# Patient Record
Sex: FEMALE | Race: WHITE | NOT HISPANIC OR LATINO | URBAN - NONMETROPOLITAN AREA
[De-identification: names, ages, dates, MRNs, and addresses within clinical notes are randomized per-mention and may not be internally consistent; named-entity substitution may affect disease eponyms.]

---

## 2019-12-29 ENCOUNTER — APPOINTMENT (EMERGENCY)
Dept: RADIOLOGY | Facility: HOSPITAL | Age: 49
End: 2019-12-29
Payer: COMMERCIAL

## 2019-12-29 ENCOUNTER — HOSPITAL ENCOUNTER (EMERGENCY)
Facility: HOSPITAL | Age: 49
Discharge: HOME | End: 2019-12-29
Admitting: EMERGENCY MEDICINE
Payer: COMMERCIAL

## 2019-12-29 VITALS
RESPIRATION RATE: 18 BRPM | WEIGHT: 195 LBS | HEART RATE: 66 BPM | SYSTOLIC BLOOD PRESSURE: 190 MMHG | TEMPERATURE: 98.1 F | DIASTOLIC BLOOD PRESSURE: 88 MMHG | OXYGEN SATURATION: 98 %

## 2019-12-29 DIAGNOSIS — M77.9 TENDONITIS: Primary | ICD-10-CM

## 2019-12-29 PROCEDURE — 96372 THER/PROPH/DIAG INJ SC/IM: CPT

## 2019-12-29 PROCEDURE — 73080 X-RAY EXAM OF ELBOW: CPT

## 2019-12-29 PROCEDURE — 73130 X-RAY EXAM OF HAND: CPT

## 2019-12-29 PROCEDURE — 99284 EMERGENCY DEPT VISIT MOD MDM: CPT | Performed by: EMERGENCY MEDICINE

## 2019-12-29 PROCEDURE — 73110 X-RAY EXAM OF WRIST: CPT

## 2019-12-29 PROCEDURE — 99283 EMERGENCY DEPT VISIT LOW MDM: CPT

## 2019-12-29 RX ORDER — BISOPROLOL FUMARATE AND HYDROCHLOROTHIAZIDE 5; 6.25 MG/1; MG/1
1 TABLET ORAL DAILY
COMMUNITY
Start: 2018-07-05

## 2019-12-29 RX ORDER — KETOROLAC TROMETHAMINE 30 MG/ML
30 INJECTION, SOLUTION INTRAMUSCULAR; INTRAVENOUS ONCE
Status: COMPLETED | OUTPATIENT
Start: 2019-12-29 | End: 2019-12-29

## 2019-12-29 RX ORDER — FLUTICASONE PROPIONATE 50 MCG
2 SPRAY, SUSPENSION (ML) NASAL DAILY
COMMUNITY
Start: 2019-09-16

## 2019-12-29 RX ORDER — LEVOTHYROXINE SODIUM 112 UG/1
1 TABLET ORAL DAILY
COMMUNITY
Start: 2018-04-06

## 2019-12-29 RX ORDER — LISINOPRIL 10 MG/1
1 TABLET ORAL DAILY
COMMUNITY
Start: 2018-06-19

## 2019-12-29 RX ADMIN — KETOROLAC TROMETHAMINE 30 MG: 30 INJECTION, SOLUTION INTRAMUSCULAR at 13:56

## 2019-12-29 NOTE — ED PROVIDER NOTES
History  Chief Complaint   Patient presents with    Wrist Pain     right wrist pain with some swelling     The patient is a 44year old female who states that after work over the last 2 days she has increasing right wrist pain  She states that she works for [employer] and states that she has been having right wrist pain with movement  She also states that she noticed some swelling in her hand today  She denies any falls  She has been taking naproxen and tylenol at home  Wrist Pain   Location:  Right wrist  Quality:  Aching  Severity:  Mild  Onset quality:  Gradual  Duration:  2 days  Progression:  Worsening  Chronicity:  New  Context:  After working  Relieved by:  Rest  Worsened by: Movement  Ineffective treatments:  Tylenol and motrin   Associated symptoms: no abdominal pain, no chest pain, no congestion, no cough, no ear pain, no fever, no loss of consciousness, no myalgias, no nausea, no rash, no rhinorrhea, no shortness of breath, no sore throat, no vomiting and no wheezing        Prior to Admission Medications   Prescriptions Last Dose Informant Patient Reported? Taking?   bisoprolol-hydrochlorothiazide (ZIAC) 5-6 25 MG per tablet   Yes Yes   Si tablet daily   fluticasone (FLONASE) 50 mcg/act nasal spray   Yes Yes   Si sprays daily   levothyroxine 112 mcg tablet   Yes Yes   Sig: Take 1 tablet by mouth daily   lisinopril (ZESTRIL) 10 mg tablet   Yes Yes   Sig: Take 1 tablet by mouth daily      Facility-Administered Medications: None       Past Medical History:   Diagnosis Date    Disease of thyroid gland     Hyperlipidemia     Hypertension        History reviewed  No pertinent surgical history  History reviewed  No pertinent family history  I have reviewed and agree with the history as documented      Social History     Tobacco Use    Smoking status: Never Smoker    Smokeless tobacco: Never Used   Substance Use Topics    Alcohol use: Not Currently    Drug use: Never        Review of Systems Constitutional: Negative for chills and fever  HENT: Negative for congestion, ear pain, rhinorrhea and sore throat  Eyes: Negative for pain and visual disturbance  Respiratory: Negative for cough, shortness of breath and wheezing  Cardiovascular: Negative for chest pain, palpitations and leg swelling  Gastrointestinal: Negative for abdominal pain, nausea and vomiting  Genitourinary: Negative for dysuria and hematuria  Musculoskeletal: Negative for arthralgias, back pain and myalgias  Skin: Negative for color change and rash  Neurological: Negative for dizziness, seizures, loss of consciousness and syncope  Physical Exam  Physical Exam   Constitutional: She is oriented to person, place, and time  She appears well-developed and well-nourished  No distress  HENT:   Head: Normocephalic and atraumatic  Right Ear: External ear normal    Left Ear: External ear normal    Mouth/Throat: Oropharynx is clear and moist    Eyes: Pupils are equal, round, and reactive to light  EOM are normal    Neck: Normal range of motion  Neck supple  Cardiovascular: Normal rate, regular rhythm and intact distal pulses  No murmur heard  Pulmonary/Chest: Effort normal and breath sounds normal  No respiratory distress  She has no wheezes  Abdominal: Soft  Bowel sounds are normal  She exhibits no mass  There is no tenderness  There is no rebound  No hernia  Musculoskeletal:        Right elbow: Normal        Right wrist: She exhibits tenderness  Right hand: She exhibits normal capillary refill  Pain with movement of right wrist over ulnar aspect   Neurological: She is alert and oriented to person, place, and time  Coordination normal    Skin: Skin is warm and dry  Capillary refill takes less than 2 seconds  Psychiatric: She has a normal mood and affect  Her behavior is normal    Nursing note and vitals reviewed        Vital Signs  ED Triage Vitals   Temperature Pulse Respirations Blood Pressure SpO2 12/29/19 1245 12/29/19 1245 12/29/19 1245 12/29/19 1245 12/29/19 1245   98 1 °F (36 7 °C) 78 18 (!) 219/106 99 %      Temp src Heart Rate Source Patient Position - Orthostatic VS BP Location FiO2 (%)   -- -- -- -- --             Pain Score       12/29/19 1356       5           Vitals:    12/29/19 1245 12/29/19 1358   BP: (!) 219/106 (!) 190/88   Pulse: 78 66         Visual Acuity      ED Medications  Medications   ketorolac (TORADOL) injection 30 mg (30 mg Intramuscular Given 12/29/19 1356)       Diagnostic Studies  Results Reviewed     None                 XR wrist 3+ views RIGHT    (Results Pending)   XR hand 3+ views RIGHT    (Results Pending)   XR elbow 3+ vw right    (Results Pending)              Procedures  Procedures         ED Course                               MDM  Number of Diagnoses or Management Options  Tendonitis:   Diagnosis management comments: ddx tendonitis, sprain, contusion  XRAY unremarkable  Patient having pain with ROM  Patients symptoms most likely tendonitis due to repetitive injury at work  Patient given cock-up brace to right wrist and instructed to follow up with orthopedics        Amount and/or Complexity of Data Reviewed  Tests in the radiology section of CPT®: ordered and reviewed          Disposition  Final diagnoses:   Tendonitis     Time reflects when diagnosis was documented in both MDM as applicable and the Disposition within this note     Time User Action Codes Description Comment    12/29/2019  1:38 PM Lesly Renee Add [M77 9] Tendonitis       ED Disposition     ED Disposition Condition Date/Time Comment    Discharge Stable Sun Dec 29, 2019  1:38 PM Charisse Blizzard Black discharge to home/self care              Follow-up Information    None         Discharge Medication List as of 12/29/2019  1:40 PM      CONTINUE these medications which have NOT CHANGED    Details   bisoprolol-hydrochlorothiazide (ZIAC) 5-6 25 MG per tablet 1 tablet daily, Starting Thu 7/5/2018, Historical Med fluticasone (FLONASE) 50 mcg/act nasal spray 2 sprays daily, Starting Mon 9/16/2019, Historical Med      levothyroxine 112 mcg tablet Take 1 tablet by mouth daily, Starting Fri 4/6/2018, Historical Med      lisinopril (ZESTRIL) 10 mg tablet Take 1 tablet by mouth daily, Starting Tue 6/19/2018, Historical Med               ED Provider  Electronically Signed by           Nilson Cervantes PA-C  12/29/19 5560

## 2019-12-29 NOTE — ED ATTENDING ATTESTATION
12/29/2019  ILuis MD, saw and evaluated the patient  I have discussed the patient with the resident/non-physician practitioner and agree with the resident's/non-physician practitioner's findings, Plan of Care, and MDM as documented in the resident's/non-physician practitioner's note, except where noted  All available labs and Radiology studies were reviewed  I was present for key portions of any procedure(s) performed by the resident/non-physician practitioner and I was immediately available to provide assistance  At this point I agree with the current assessment done in the Emergency Department  I have conducted an independent evaluation of this patient a history and physical is as follows:    ED Course     Patient complains increasing right wrist pain over the past 2 days  No known trauma  X-rays of the right wrist hand and elbow are negative  Symptoms are probably due to overuse      Critical Care Time  Procedures

## 2019-12-29 NOTE — DISCHARGE INSTRUCTIONS
Please follow up with orthopedics   You may also try an over the counter brace for elbow tendinitis   Can take tylenol/ motrin at home or naproxen do not take motrin and naproxen

## 2021-04-13 DIAGNOSIS — Z23 ENCOUNTER FOR IMMUNIZATION: ICD-10-CM

## 2021-07-18 ENCOUNTER — APPOINTMENT (EMERGENCY)
Dept: RADIOLOGY | Facility: HOSPITAL | Age: 51
End: 2021-07-18
Payer: COMMERCIAL

## 2021-07-18 ENCOUNTER — HOSPITAL ENCOUNTER (EMERGENCY)
Facility: HOSPITAL | Age: 51
Discharge: HOME | End: 2021-07-18
Attending: EMERGENCY MEDICINE
Payer: COMMERCIAL

## 2021-07-18 VITALS
DIASTOLIC BLOOD PRESSURE: 93 MMHG | WEIGHT: 221.3 LBS | OXYGEN SATURATION: 95 % | HEART RATE: 69 BPM | RESPIRATION RATE: 20 BRPM | TEMPERATURE: 97.8 F | SYSTOLIC BLOOD PRESSURE: 198 MMHG

## 2021-07-18 DIAGNOSIS — S63.502A SPRAIN OF LEFT WRIST, INITIAL ENCOUNTER: Primary | ICD-10-CM

## 2021-07-18 PROCEDURE — 99282 EMERGENCY DEPT VISIT SF MDM: CPT | Performed by: EMERGENCY MEDICINE

## 2021-07-18 PROCEDURE — 73110 X-RAY EXAM OF WRIST: CPT

## 2021-07-18 PROCEDURE — 73130 X-RAY EXAM OF HAND: CPT

## 2021-07-18 PROCEDURE — 99283 EMERGENCY DEPT VISIT LOW MDM: CPT

## 2021-07-19 ENCOUNTER — TELEPHONE (OUTPATIENT)
Dept: EMERGENCY DEPT | Facility: HOSPITAL | Age: 51
End: 2021-07-19

## 2021-07-19 NOTE — ED PROVIDER NOTES
History  Chief Complaint   Patient presents with    Hand Injury     patient reports [place] and [place]   landed on left buttocks and left hand, c/o hand pain  denies hitting head, no loc  Patient is a 69-year-old female presenting to the emergency department complaining of injury to her left wrist/hand that occurred yesterday while she was [place] in the [place], states she slipped and fell backwards onto her buttocks with left hand outstretched, has been taking Tylenol or Motrin at home with minimal relief of symptoms, denies head injury or loss of consciousness          Prior to Admission Medications   Prescriptions Last Dose Informant Patient Reported? Taking?   bisoprolol-hydrochlorothiazide [facility]) 5-6 25 MG per tablet 2021 at Unknown time  Yes Yes   Si tablet daily   fluticasone (FLONASE) 50 mcg/act nasal spray Past Month at Unknown time  Yes Yes   Si sprays daily   levothyroxine 112 mcg tablet 2021 at Unknown time  Yes Yes   Sig: Take 1 tablet by mouth daily   lisinopril (ZESTRIL) 10 mg tablet 2021 at Unknown time  Yes Yes   Sig: Take 1 tablet by mouth daily      Facility-Administered Medications: None       Past Medical History:   Diagnosis Date    Disease of thyroid gland     Hyperlipidemia     Hypertension        History reviewed  No pertinent surgical history  History reviewed  No pertinent family history  I have reviewed and agree with the history as documented  E-Cigarette/Vaping    E-Cigarette Use Never User      E-Cigarette/Vaping Substances    Nicotine No     THC No     CBD No     Flavoring No     Other No     Unknown No      Social History     Tobacco Use    Smoking status: Former Smoker    Smokeless tobacco: Never Used   Vaping Use    Vaping Use: Never used   Substance Use Topics    Alcohol use: Yes    Drug use: Never       Review of Systems   Constitutional: Negative  HENT: Negative      Eyes: Negative  Respiratory: Negative  Cardiovascular: Negative  Gastrointestinal: Negative  Endocrine: Negative  Genitourinary: Negative  Musculoskeletal: Positive for arthralgias (Left wrist/hand pain)  Skin: Negative  Allergic/Immunologic: Negative  Neurological: Negative  Hematological: Negative  Psychiatric/Behavioral: Negative  Physical Exam  Physical Exam  Constitutional:       Appearance: She is well-developed  HENT:      Head: Normocephalic and atraumatic  Eyes:      Conjunctiva/sclera: Conjunctivae normal       Pupils: Pupils are equal, round, and reactive to light  Cardiovascular:      Rate and Rhythm: Normal rate  Pulmonary:      Effort: Pulmonary effort is normal    Abdominal:      Palpations: Abdomen is soft  Musculoskeletal:         General: Swelling and tenderness (Swelling and tenderness to the dorsal surface of left hand over the midportion the metacarpals, with tenderness over the distal radius, 2+ radial pulses, distal sensation and motor is intact with full range of motion at wrist as well) present  Normal range of motion  Cervical back: Normal range of motion and neck supple  Skin:     General: Skin is warm and dry  Neurological:      Mental Status: She is alert and oriented to person, place, and time           Vital Signs  ED Triage Vitals   Temperature Pulse Respirations Blood Pressure SpO2   07/18/21 2236 07/18/21 2235 07/18/21 2235 07/18/21 2235 07/18/21 2235   97 8 °F (36 6 °C) 80 19 (!) 234/126 100 %      Temp Source Heart Rate Source Patient Position - Orthostatic VS BP Location FiO2 (%)   07/18/21 2236 07/18/21 2300 07/18/21 2235 07/18/21 2235 --   Temporal Monitor Lying Right arm       Pain Score       07/18/21 2235       8           Vitals:    07/18/21 2235 07/18/21 2300   BP: (!) 234/126 (!) 198/93   Pulse: 80 69   Patient Position - Orthostatic VS: Lying                ED Medications  Medications - No data to display    Diagnostic Studies  Results Reviewed     None                 XR wrist 3+ views LEFT   ED Interpretation by Fannie Calderón DO (07/18 2305)   No acute findings      XR hand 3+ views LEFT   ED Interpretation by Fannie Calderón DO (07/18 2305)   No acute findings                      ED Course  ED Course as of Jul 18 2342   Sun Jul 18, 2021 2341 Imaging findings unremarkable and discussed at bedside, symptoms consistent with wrist sprain, provided with Ace wrap, advised to ice and elevate, Tylenol or Motrin as needed for pain, follow-up with PCP as needed, return if symptoms worsen, patient acknowledges understanding and agreement with this plan                                SBIRT 20yo+      Most Recent Value   SBIRT (23 yo +)   In order to provide better care to our patients, we are screening all of our patients for alcohol and drug use  Would it be okay to ask you these screening questions? No Filed at: 07/18/2021 2238                      Disposition  Final diagnoses:   Sprain of left wrist, initial encounter     Time reflects when diagnosis was documented in both MDM as applicable and the Disposition within this note     Time User Action Codes Description Comment    7/18/2021 11:06 PM Mei Medina Add [[id number]] Sprain of left wrist, initial encounter       ED Disposition     ED Disposition Condition Date/Time Comment    Discharge Stable Sun Jul 18, 2021 11:06 PM Seth Burton discharge to home/self care              Follow-up Information     Follow up With Specialties Details Why Contact Info    Jose Gamboa DO Family Medicine In 3 days  [address]  [phone]            Discharge Medication List as of 7/18/2021 11:06 PM      CONTINUE these medications which have NOT CHANGED    Details   bisoprolol-hydrochlorothiazide (ZIAC) 5-6 25 MG per tablet 1 tablet daily, Starting u 7/5/2018, Historical Med      fluticasone (FLONASE) 50 mcg/act nasal spray 2 sprays daily, Starting Mon 9/16/2019, Historical Med      levothyroxine 112 mcg tablet Take 1 tablet by mouth daily, Starting Fri 4/6/2018, Historical Med      lisinopril (ZESTRIL) 10 mg tablet Take 1 tablet by mouth daily, Starting Tue 6/19/2018, Historical Med           No discharge procedures on file      PDMP Review     None          ED Provider  Electronically Signed by           Sravani Burrows DO  07/18/21 5307

## 2021-07-26 DIAGNOSIS — E03.8 OTHER SPECIFIED HYPOTHYROIDISM: ICD-10-CM

## 2021-07-26 DIAGNOSIS — E04.9 NONTOXIC GOITER, UNSPECIFIED: ICD-10-CM

## 2021-07-26 DIAGNOSIS — E06.3 AUTOIMMUNE THYROIDITIS: ICD-10-CM
